# Patient Record
Sex: MALE | Race: WHITE
[De-identification: names, ages, dates, MRNs, and addresses within clinical notes are randomized per-mention and may not be internally consistent; named-entity substitution may affect disease eponyms.]

---

## 2018-06-24 ENCOUNTER — HOSPITAL ENCOUNTER (INPATIENT)
Dept: HOSPITAL 35 - ER | Age: 53
LOS: 7 days | Discharge: HOME | DRG: 391 | End: 2018-07-01
Attending: INTERNAL MEDICINE | Admitting: INTERNAL MEDICINE
Payer: COMMERCIAL

## 2018-06-24 VITALS — SYSTOLIC BLOOD PRESSURE: 128 MMHG | DIASTOLIC BLOOD PRESSURE: 71 MMHG

## 2018-06-24 VITALS — SYSTOLIC BLOOD PRESSURE: 128 MMHG | DIASTOLIC BLOOD PRESSURE: 74 MMHG

## 2018-06-24 VITALS — BODY MASS INDEX: 42.66 KG/M2 | HEIGHT: 72.01 IN | WEIGHT: 315 LBS

## 2018-06-24 VITALS — DIASTOLIC BLOOD PRESSURE: 77 MMHG | SYSTOLIC BLOOD PRESSURE: 145 MMHG

## 2018-06-24 VITALS — SYSTOLIC BLOOD PRESSURE: 146 MMHG | DIASTOLIC BLOOD PRESSURE: 57 MMHG

## 2018-06-24 VITALS — SYSTOLIC BLOOD PRESSURE: 137 MMHG | DIASTOLIC BLOOD PRESSURE: 70 MMHG

## 2018-06-24 DIAGNOSIS — E66.01: ICD-10-CM

## 2018-06-24 DIAGNOSIS — E43: ICD-10-CM

## 2018-06-24 DIAGNOSIS — K57.20: Primary | ICD-10-CM

## 2018-06-24 DIAGNOSIS — I10: ICD-10-CM

## 2018-06-24 DIAGNOSIS — Z87.81: ICD-10-CM

## 2018-06-24 DIAGNOSIS — M19.90: ICD-10-CM

## 2018-06-24 DIAGNOSIS — Z96.643: ICD-10-CM

## 2018-06-24 DIAGNOSIS — Z79.899: ICD-10-CM

## 2018-06-24 LAB
ALBUMIN SERPL-MCNC: 3.1 G/DL (ref 3.4–5)
ALT SERPL-CCNC: 25 U/L (ref 30–65)
ANION GAP SERPL CALC-SCNC: 9 MMOL/L (ref 7–16)
AST SERPL-CCNC: 17 U/L (ref 15–37)
BASOPHILS NFR BLD AUTO: 0.3 % (ref 0–2)
BILIRUB SERPL-MCNC: 2.4 MG/DL
BILIRUB UR-MCNC: NEGATIVE MG/DL
BUN SERPL-MCNC: 12 MG/DL (ref 7–18)
CALCIUM SERPL-MCNC: 8.8 MG/DL (ref 8.5–10.1)
CHLORIDE SERPL-SCNC: 100 MMOL/L (ref 98–107)
CO2 SERPL-SCNC: 23 MMOL/L (ref 21–32)
COLOR UR: YELLOW
CREAT SERPL-MCNC: 0.9 MG/DL (ref 0.7–1.3)
EOSINOPHIL NFR BLD: 0.1 % (ref 0–3)
ERYTHROCYTE [DISTWIDTH] IN BLOOD BY AUTOMATED COUNT: 13.8 % (ref 10.5–14.5)
GLUCOSE SERPL-MCNC: 177 MG/DL (ref 74–106)
GRANULOCYTES NFR BLD MANUAL: 87.2 % (ref 36–66)
HCT VFR BLD CALC: 43.6 % (ref 42–52)
HGB BLD-MCNC: 14.8 GM/DL (ref 14–18)
KETONES UR STRIP-MCNC: NEGATIVE MG/DL
LIPASE: 97 U/L (ref 73–393)
LYMPHOCYTES NFR BLD AUTO: 8.5 % (ref 24–44)
MCH RBC QN AUTO: 28.9 PG (ref 26–34)
MCHC RBC AUTO-ENTMCNC: 33.9 G/DL (ref 28–37)
MCV RBC: 85.2 FL (ref 80–100)
MONOCYTES NFR BLD: 3.9 % (ref 1–8)
NEUTROPHILS # BLD: 16.6 THOU/UL (ref 1.4–8.2)
PLATELET # BLD: 220 THOU/UL (ref 150–400)
POTASSIUM SERPL-SCNC: 3.9 MMOL/L (ref 3.5–5.1)
PROT SERPL-MCNC: 7.4 G/DL (ref 6.4–8.2)
RBC # BLD AUTO: 5.12 MIL/UL (ref 4.5–6)
RBC # UR STRIP: NEGATIVE /UL
SODIUM SERPL-SCNC: 132 MMOL/L (ref 136–145)
SP GR UR STRIP: 1.02 (ref 1–1.03)
URINE CLARITY: (no result)
URINE GLUCOSE-RANDOM*: NEGATIVE
URINE LEUKOCYTES-REFLEX: (no result)
URINE NITRITE-REFLEX: NEGATIVE
URINE PROTEIN (DIPSTICK): (no result)
UROBILINOGEN UR STRIP-ACNC: 1 E.U./DL (ref 0.2–1)
WBC # BLD AUTO: 19 THOU/UL (ref 4–11)

## 2018-06-24 PROCEDURE — 27001 TENOTOMY ADDUCTOR HIP OPEN: CPT

## 2018-06-24 PROCEDURE — 10183: CPT

## 2018-06-24 PROCEDURE — 15002 WOUND PREP TRK/ARM/LEG: CPT

## 2018-06-25 VITALS — SYSTOLIC BLOOD PRESSURE: 144 MMHG | DIASTOLIC BLOOD PRESSURE: 76 MMHG

## 2018-06-25 VITALS — SYSTOLIC BLOOD PRESSURE: 149 MMHG | DIASTOLIC BLOOD PRESSURE: 62 MMHG

## 2018-06-26 VITALS — DIASTOLIC BLOOD PRESSURE: 57 MMHG | SYSTOLIC BLOOD PRESSURE: 131 MMHG

## 2018-06-26 VITALS — DIASTOLIC BLOOD PRESSURE: 79 MMHG | SYSTOLIC BLOOD PRESSURE: 151 MMHG

## 2018-06-26 VITALS — DIASTOLIC BLOOD PRESSURE: 63 MMHG | SYSTOLIC BLOOD PRESSURE: 120 MMHG

## 2018-06-26 VITALS — DIASTOLIC BLOOD PRESSURE: 76 MMHG | SYSTOLIC BLOOD PRESSURE: 148 MMHG

## 2018-06-26 VITALS — SYSTOLIC BLOOD PRESSURE: 131 MMHG | DIASTOLIC BLOOD PRESSURE: 84 MMHG

## 2018-06-26 LAB
ALBUMIN SERPL-MCNC: 2.7 G/DL (ref 3.4–5)
ALT SERPL-CCNC: 19 U/L (ref 30–65)
ANION GAP SERPL CALC-SCNC: 5 MMOL/L (ref 7–16)
AST SERPL-CCNC: 19 U/L (ref 15–37)
BILIRUB SERPL-MCNC: 1.1 MG/DL
BUN SERPL-MCNC: 7 MG/DL (ref 7–18)
CALCIUM SERPL-MCNC: 8.4 MG/DL (ref 8.5–10.1)
CHLORIDE SERPL-SCNC: 101 MMOL/L (ref 98–107)
CO2 SERPL-SCNC: 26 MMOL/L (ref 21–32)
CREAT SERPL-MCNC: 0.7 MG/DL (ref 0.7–1.3)
ERYTHROCYTE [DISTWIDTH] IN BLOOD BY AUTOMATED COUNT: 13.8 % (ref 10.5–14.5)
GLUCOSE SERPL-MCNC: 148 MG/DL (ref 74–106)
HCT VFR BLD CALC: 40 % (ref 42–52)
HGB BLD-MCNC: 13.7 GM/DL (ref 14–18)
MCH RBC QN AUTO: 29.2 PG (ref 26–34)
MCHC RBC AUTO-ENTMCNC: 34.3 G/DL (ref 28–37)
MCV RBC: 85.3 FL (ref 80–100)
PLATELET # BLD: 190 THOU/UL (ref 150–400)
POTASSIUM SERPL-SCNC: 3.5 MMOL/L (ref 3.5–5.1)
PROT SERPL-MCNC: 6.9 G/DL (ref 6.4–8.2)
RBC # BLD AUTO: 4.69 MIL/UL (ref 4.5–6)
SODIUM SERPL-SCNC: 132 MMOL/L (ref 136–145)
WBC # BLD AUTO: 10.2 THOU/UL (ref 4–11)

## 2018-06-27 VITALS — DIASTOLIC BLOOD PRESSURE: 81 MMHG | SYSTOLIC BLOOD PRESSURE: 129 MMHG

## 2018-06-27 VITALS — SYSTOLIC BLOOD PRESSURE: 134 MMHG | DIASTOLIC BLOOD PRESSURE: 85 MMHG

## 2018-06-27 LAB
ANION GAP SERPL CALC-SCNC: 5 MMOL/L (ref 7–16)
BILIRUB UR-MCNC: NEGATIVE MG/DL
BUN SERPL-MCNC: 8 MG/DL (ref 7–18)
CALCIUM SERPL-MCNC: 8.6 MG/DL (ref 8.5–10.1)
CHLORIDE SERPL-SCNC: 102 MMOL/L (ref 98–107)
CO2 SERPL-SCNC: 27 MMOL/L (ref 21–32)
COLOR UR: (no result)
CREAT SERPL-MCNC: 0.8 MG/DL (ref 0.7–1.3)
ERYTHROCYTE [DISTWIDTH] IN BLOOD BY AUTOMATED COUNT: 13.8 % (ref 10.5–14.5)
GLUCOSE SERPL-MCNC: 171 MG/DL (ref 74–106)
HCT VFR BLD CALC: 41.5 % (ref 42–52)
HGB BLD-MCNC: 14 GM/DL (ref 14–18)
KETONES UR STRIP-MCNC: NEGATIVE MG/DL
MCH RBC QN AUTO: 28.9 PG (ref 26–34)
MCHC RBC AUTO-ENTMCNC: 33.7 G/DL (ref 28–37)
MCV RBC: 85.7 FL (ref 80–100)
PLATELET # BLD: 240 THOU/UL (ref 150–400)
POTASSIUM SERPL-SCNC: 3.7 MMOL/L (ref 3.5–5.1)
RBC # BLD AUTO: 4.84 MIL/UL (ref 4.5–6)
RBC # UR STRIP: NEGATIVE /UL
SODIUM SERPL-SCNC: 134 MMOL/L (ref 136–145)
SP GR UR STRIP: 1.02 (ref 1–1.03)
URINE CLARITY: CLEAR
URINE GLUCOSE-RANDOM*: NEGATIVE
URINE LEUKOCYTES-REFLEX: NEGATIVE
URINE NITRITE-REFLEX: NEGATIVE
URINE PROTEIN (DIPSTICK): NEGATIVE
UROBILINOGEN UR STRIP-ACNC: 2 E.U./DL (ref 0.2–1)
WBC # BLD AUTO: 9.2 THOU/UL (ref 4–11)

## 2018-06-28 VITALS — DIASTOLIC BLOOD PRESSURE: 89 MMHG | SYSTOLIC BLOOD PRESSURE: 158 MMHG

## 2018-06-28 VITALS — SYSTOLIC BLOOD PRESSURE: 130 MMHG | DIASTOLIC BLOOD PRESSURE: 78 MMHG

## 2018-06-28 PROCEDURE — B54NZZA ULTRASONOGRAPHY OF LEFT UPPER EXTREMITY VEINS, GUIDANCE: ICD-10-PCS | Performed by: INTERNAL MEDICINE

## 2018-06-28 PROCEDURE — 05HY33Z INSERTION OF INFUSION DEVICE INTO UPPER VEIN, PERCUTANEOUS APPROACH: ICD-10-PCS | Performed by: INTERNAL MEDICINE

## 2018-06-29 VITALS — DIASTOLIC BLOOD PRESSURE: 89 MMHG | SYSTOLIC BLOOD PRESSURE: 158 MMHG

## 2018-06-29 VITALS — SYSTOLIC BLOOD PRESSURE: 151 MMHG | DIASTOLIC BLOOD PRESSURE: 102 MMHG

## 2018-06-29 LAB
ANION GAP SERPL CALC-SCNC: 10 MMOL/L (ref 7–16)
BUN SERPL-MCNC: 13 MG/DL (ref 7–18)
CALCIUM SERPL-MCNC: 8.5 MG/DL (ref 8.5–10.1)
CHLORIDE SERPL-SCNC: 105 MMOL/L (ref 98–107)
CO2 SERPL-SCNC: 25 MMOL/L (ref 21–32)
CREAT SERPL-MCNC: 0.7 MG/DL (ref 0.7–1.3)
ERYTHROCYTE [DISTWIDTH] IN BLOOD BY AUTOMATED COUNT: 13.9 % (ref 10.5–14.5)
GLUCOSE SERPL-MCNC: 110 MG/DL (ref 74–106)
HCT VFR BLD CALC: 42.2 % (ref 42–52)
HGB BLD-MCNC: 14.6 GM/DL (ref 14–18)
MCH RBC QN AUTO: 28.8 PG (ref 26–34)
MCHC RBC AUTO-ENTMCNC: 34.6 G/DL (ref 28–37)
MCV RBC: 83.3 FL (ref 80–100)
PLATELET # BLD: 276 THOU/UL (ref 150–400)
POTASSIUM SERPL-SCNC: 4.4 MMOL/L (ref 3.5–5.1)
RBC # BLD AUTO: 5.07 MIL/UL (ref 4.5–6)
SODIUM SERPL-SCNC: 140 MMOL/L (ref 136–145)
WBC # BLD AUTO: 9.9 THOU/UL (ref 4–11)

## 2018-06-30 VITALS — DIASTOLIC BLOOD PRESSURE: 104 MMHG | SYSTOLIC BLOOD PRESSURE: 149 MMHG

## 2018-06-30 VITALS — DIASTOLIC BLOOD PRESSURE: 88 MMHG | SYSTOLIC BLOOD PRESSURE: 131 MMHG

## 2018-07-01 VITALS — DIASTOLIC BLOOD PRESSURE: 94 MMHG | SYSTOLIC BLOOD PRESSURE: 138 MMHG

## 2018-07-02 ENCOUNTER — HOSPITAL ENCOUNTER (OUTPATIENT)
Dept: HOSPITAL 35 - OPONC | Age: 53
End: 2018-07-02
Attending: SPECIALIST
Payer: COMMERCIAL

## 2018-07-02 VITALS — SYSTOLIC BLOOD PRESSURE: 124 MMHG | DIASTOLIC BLOOD PRESSURE: 86 MMHG

## 2018-07-02 DIAGNOSIS — K57.20: Primary | ICD-10-CM

## 2018-07-02 LAB
ALBUMIN SERPL-MCNC: 3.2 G/DL (ref 3.4–5)
ALT SERPL-CCNC: 64 U/L (ref 30–65)
ANION GAP SERPL CALC-SCNC: 6 MMOL/L (ref 7–16)
AST SERPL-CCNC: 29 U/L (ref 15–37)
BILIRUB SERPL-MCNC: 0.5 MG/DL
BUN SERPL-MCNC: 16 MG/DL (ref 7–18)
CALCIUM SERPL-MCNC: 9.2 MG/DL (ref 8.5–10.1)
CHLORIDE SERPL-SCNC: 103 MMOL/L (ref 98–107)
CO2 SERPL-SCNC: 27 MMOL/L (ref 21–32)
CREAT SERPL-MCNC: 0.8 MG/DL (ref 0.7–1.3)
ERYTHROCYTE [DISTWIDTH] IN BLOOD BY AUTOMATED COUNT: 14.1 % (ref 10.5–14.5)
GLUCOSE SERPL-MCNC: 123 MG/DL (ref 74–106)
HCT VFR BLD CALC: 44.8 % (ref 42–52)
HGB BLD-MCNC: 15.6 GM/DL (ref 14–18)
MCH RBC QN AUTO: 28.9 PG (ref 26–34)
MCHC RBC AUTO-ENTMCNC: 34.7 G/DL (ref 28–37)
MCV RBC: 83.3 FL (ref 80–100)
PLATELET # BLD: 343 THOU/UL (ref 150–400)
POTASSIUM SERPL-SCNC: 4.2 MMOL/L (ref 3.5–5.1)
PROT SERPL-MCNC: 8 G/DL (ref 6.4–8.2)
RBC # BLD AUTO: 5.38 MIL/UL (ref 4.5–6)
SODIUM SERPL-SCNC: 136 MMOL/L (ref 136–145)
WBC # BLD AUTO: 11.1 THOU/UL (ref 4–11)

## 2018-07-02 PROCEDURE — 95000: CPT

## 2018-07-04 ENCOUNTER — HOSPITAL ENCOUNTER (OUTPATIENT)
Dept: HOSPITAL 35 - OPONC | Age: 53
End: 2018-07-04
Attending: SPECIALIST
Payer: COMMERCIAL

## 2018-07-04 DIAGNOSIS — K57.20: Primary | ICD-10-CM

## 2018-07-04 PROCEDURE — 95000: CPT

## 2018-07-05 ENCOUNTER — HOSPITAL ENCOUNTER (OUTPATIENT)
Dept: HOSPITAL 35 - OPONC | Age: 53
End: 2018-07-05
Attending: SPECIALIST
Payer: COMMERCIAL

## 2018-07-05 VITALS — SYSTOLIC BLOOD PRESSURE: 140 MMHG | DIASTOLIC BLOOD PRESSURE: 74 MMHG

## 2018-07-05 DIAGNOSIS — K57.20: Primary | ICD-10-CM

## 2018-07-05 PROCEDURE — 95000: CPT

## 2018-07-06 ENCOUNTER — HOSPITAL ENCOUNTER (OUTPATIENT)
Dept: HOSPITAL 35 - OPONC | Age: 53
End: 2018-07-06
Attending: SPECIALIST
Payer: COMMERCIAL

## 2018-07-06 VITALS — SYSTOLIC BLOOD PRESSURE: 142 MMHG | DIASTOLIC BLOOD PRESSURE: 68 MMHG

## 2018-07-06 DIAGNOSIS — K57.20: Primary | ICD-10-CM

## 2018-07-06 PROCEDURE — 95000: CPT

## 2018-07-07 ENCOUNTER — HOSPITAL ENCOUNTER (OUTPATIENT)
Dept: HOSPITAL 35 - OPONC | Age: 53
End: 2018-07-07
Attending: SPECIALIST
Payer: COMMERCIAL

## 2018-07-07 DIAGNOSIS — K57.20: Primary | ICD-10-CM

## 2018-07-07 PROCEDURE — 95000: CPT

## 2018-07-08 ENCOUNTER — HOSPITAL ENCOUNTER (OUTPATIENT)
Dept: HOSPITAL 35 - OPONC | Age: 53
End: 2018-07-08
Attending: SPECIALIST
Payer: COMMERCIAL

## 2018-07-08 DIAGNOSIS — K57.20: Primary | ICD-10-CM

## 2018-07-08 PROCEDURE — 95000: CPT

## 2018-07-09 ENCOUNTER — HOSPITAL ENCOUNTER (OUTPATIENT)
Dept: HOSPITAL 35 - OPONC | Age: 53
End: 2018-07-09
Attending: SPECIALIST
Payer: COMMERCIAL

## 2018-07-09 VITALS — DIASTOLIC BLOOD PRESSURE: 76 MMHG | SYSTOLIC BLOOD PRESSURE: 142 MMHG

## 2018-07-09 DIAGNOSIS — K57.20: Primary | ICD-10-CM

## 2018-07-09 LAB
ALBUMIN SERPL-MCNC: 3.1 G/DL (ref 3.4–5)
ALT SERPL-CCNC: 39 U/L (ref 30–65)
ANION GAP SERPL CALC-SCNC: 8 MMOL/L (ref 7–16)
AST SERPL-CCNC: 21 U/L (ref 15–37)
BILIRUB SERPL-MCNC: 0.6 MG/DL
BUN SERPL-MCNC: 18 MG/DL (ref 7–18)
CALCIUM SERPL-MCNC: 8.8 MG/DL (ref 8.5–10.1)
CHLORIDE SERPL-SCNC: 102 MMOL/L (ref 98–107)
CO2 SERPL-SCNC: 26 MMOL/L (ref 21–32)
CREAT SERPL-MCNC: 1 MG/DL (ref 0.7–1.3)
ERYTHROCYTE [DISTWIDTH] IN BLOOD BY AUTOMATED COUNT: 14.1 % (ref 10.5–14.5)
GLUCOSE SERPL-MCNC: 216 MG/DL (ref 74–106)
HCT VFR BLD CALC: 44.1 % (ref 42–52)
HGB BLD-MCNC: 14.7 GM/DL (ref 14–18)
MCH RBC QN AUTO: 28.9 PG (ref 26–34)
MCHC RBC AUTO-ENTMCNC: 33.4 G/DL (ref 28–37)
MCV RBC: 86.7 FL (ref 80–100)
PLATELET # BLD: 338 THOU/UL (ref 150–400)
POTASSIUM SERPL-SCNC: 4.2 MMOL/L (ref 3.5–5.1)
PROT SERPL-MCNC: 7.6 G/DL (ref 6.4–8.2)
RBC # BLD AUTO: 5.09 MIL/UL (ref 4.5–6)
SODIUM SERPL-SCNC: 136 MMOL/L (ref 136–145)
WBC # BLD AUTO: 9.8 THOU/UL (ref 4–11)

## 2018-07-09 PROCEDURE — 95000: CPT

## 2018-07-10 ENCOUNTER — HOSPITAL ENCOUNTER (OUTPATIENT)
Dept: HOSPITAL 35 - OPONC | Age: 53
End: 2018-07-10
Attending: SPECIALIST
Payer: COMMERCIAL

## 2018-07-10 VITALS — DIASTOLIC BLOOD PRESSURE: 84 MMHG | SYSTOLIC BLOOD PRESSURE: 144 MMHG

## 2018-07-10 DIAGNOSIS — K57.20: Primary | ICD-10-CM

## 2018-07-10 PROCEDURE — 95000: CPT

## 2018-07-11 ENCOUNTER — HOSPITAL ENCOUNTER (OUTPATIENT)
Dept: HOSPITAL 35 - OPONC | Age: 53
End: 2018-07-11
Attending: SPECIALIST
Payer: COMMERCIAL

## 2018-07-11 VITALS — DIASTOLIC BLOOD PRESSURE: 86 MMHG | SYSTOLIC BLOOD PRESSURE: 145 MMHG

## 2018-07-11 DIAGNOSIS — K57.20: Primary | ICD-10-CM

## 2018-07-11 PROCEDURE — 95000: CPT

## 2018-07-12 ENCOUNTER — HOSPITAL ENCOUNTER (OUTPATIENT)
Dept: HOSPITAL 35 - OPONC | Age: 53
End: 2018-07-12
Attending: SPECIALIST
Payer: COMMERCIAL

## 2018-07-12 VITALS — SYSTOLIC BLOOD PRESSURE: 136 MMHG | DIASTOLIC BLOOD PRESSURE: 93 MMHG

## 2018-07-12 DIAGNOSIS — K57.20: Primary | ICD-10-CM

## 2018-07-12 PROCEDURE — 95000: CPT

## 2018-07-13 ENCOUNTER — HOSPITAL ENCOUNTER (OUTPATIENT)
Dept: HOSPITAL 35 - OPONC | Age: 53
End: 2018-07-13
Attending: SPECIALIST
Payer: COMMERCIAL

## 2018-07-13 VITALS — SYSTOLIC BLOOD PRESSURE: 130 MMHG | DIASTOLIC BLOOD PRESSURE: 78 MMHG

## 2018-07-13 DIAGNOSIS — K57.20: Primary | ICD-10-CM

## 2018-07-13 PROCEDURE — 95000: CPT

## 2018-07-14 ENCOUNTER — HOSPITAL ENCOUNTER (OUTPATIENT)
Dept: HOSPITAL 35 - OPONC | Age: 53
End: 2018-07-14
Attending: SPECIALIST
Payer: COMMERCIAL

## 2018-07-14 DIAGNOSIS — K57.20: Primary | ICD-10-CM

## 2018-07-14 PROCEDURE — 95000: CPT

## 2018-07-15 ENCOUNTER — HOSPITAL ENCOUNTER (OUTPATIENT)
Dept: HOSPITAL 35 - OPONC | Age: 53
End: 2018-07-15
Attending: SPECIALIST
Payer: COMMERCIAL

## 2018-07-15 DIAGNOSIS — K57.20: Primary | ICD-10-CM

## 2018-07-15 PROCEDURE — 95000: CPT

## 2018-07-30 ENCOUNTER — HOSPITAL ENCOUNTER (OUTPATIENT)
Dept: HOSPITAL 35 - OPONC | Age: 53
End: 2018-07-30
Attending: SPECIALIST
Payer: COMMERCIAL

## 2018-07-30 DIAGNOSIS — K57.20: Primary | ICD-10-CM

## 2018-07-30 LAB
ALBUMIN SERPL-MCNC: 3.4 G/DL (ref 3.4–5)
ALT SERPL-CCNC: 27 U/L (ref 30–65)
ANION GAP SERPL CALC-SCNC: 9 MMOL/L (ref 7–16)
AST SERPL-CCNC: 18 U/L (ref 15–37)
BILIRUB SERPL-MCNC: 0.4 MG/DL
BUN SERPL-MCNC: 13 MG/DL (ref 7–18)
CALCIUM SERPL-MCNC: 9 MG/DL (ref 8.5–10.1)
CHLORIDE SERPL-SCNC: 106 MMOL/L (ref 98–107)
CO2 SERPL-SCNC: 24 MMOL/L (ref 21–32)
CREAT SERPL-MCNC: 0.8 MG/DL (ref 0.7–1.3)
ERYTHROCYTE [DISTWIDTH] IN BLOOD BY AUTOMATED COUNT: 14.1 % (ref 10.5–14.5)
GLUCOSE SERPL-MCNC: 149 MG/DL (ref 74–106)
HCT VFR BLD CALC: 46.6 % (ref 42–52)
HGB BLD-MCNC: 15.8 GM/DL (ref 14–18)
MCH RBC QN AUTO: 29 PG (ref 26–34)
MCHC RBC AUTO-ENTMCNC: 33.9 G/DL (ref 28–37)
MCV RBC: 85.6 FL (ref 80–100)
PLATELET # BLD: 219 THOU/UL (ref 150–400)
POTASSIUM SERPL-SCNC: 4.5 MMOL/L (ref 3.5–5.1)
PROT SERPL-MCNC: 7.4 G/DL (ref 6.4–8.2)
RBC # BLD AUTO: 5.45 MIL/UL (ref 4.5–6)
SODIUM SERPL-SCNC: 139 MMOL/L (ref 136–145)
WBC # BLD AUTO: 8 THOU/UL (ref 4–11)

## 2018-07-30 PROCEDURE — 91016: CPT

## 2018-07-31 ENCOUNTER — HOSPITAL ENCOUNTER (INPATIENT)
Dept: HOSPITAL 35 - ER | Age: 53
LOS: 4 days | Discharge: HOME HEALTH | DRG: 339 | End: 2018-08-04
Attending: INTERNAL MEDICINE | Admitting: INTERNAL MEDICINE
Payer: COMMERCIAL

## 2018-07-31 VITALS — SYSTOLIC BLOOD PRESSURE: 129 MMHG | DIASTOLIC BLOOD PRESSURE: 52 MMHG

## 2018-07-31 VITALS — DIASTOLIC BLOOD PRESSURE: 109 MMHG | SYSTOLIC BLOOD PRESSURE: 183 MMHG

## 2018-07-31 VITALS — SYSTOLIC BLOOD PRESSURE: 120 MMHG | DIASTOLIC BLOOD PRESSURE: 70 MMHG

## 2018-07-31 VITALS — WEIGHT: 315 LBS | HEIGHT: 72.01 IN | BODY MASS INDEX: 42.66 KG/M2

## 2018-07-31 VITALS — SYSTOLIC BLOOD PRESSURE: 141 MMHG | DIASTOLIC BLOOD PRESSURE: 80 MMHG

## 2018-07-31 VITALS — SYSTOLIC BLOOD PRESSURE: 183 MMHG | DIASTOLIC BLOOD PRESSURE: 109 MMHG

## 2018-07-31 DIAGNOSIS — K57.32: ICD-10-CM

## 2018-07-31 DIAGNOSIS — Z96.643: ICD-10-CM

## 2018-07-31 DIAGNOSIS — K59.00: ICD-10-CM

## 2018-07-31 DIAGNOSIS — E44.1: ICD-10-CM

## 2018-07-31 DIAGNOSIS — I10: ICD-10-CM

## 2018-07-31 DIAGNOSIS — E66.01: ICD-10-CM

## 2018-07-31 DIAGNOSIS — Z79.899: ICD-10-CM

## 2018-07-31 DIAGNOSIS — T40.605A: ICD-10-CM

## 2018-07-31 DIAGNOSIS — K35.3: Primary | ICD-10-CM

## 2018-07-31 DIAGNOSIS — G89.29: ICD-10-CM

## 2018-07-31 DIAGNOSIS — M19.90: ICD-10-CM

## 2018-07-31 DIAGNOSIS — M54.9: ICD-10-CM

## 2018-07-31 LAB
ALBUMIN SERPL-MCNC: 3.2 G/DL (ref 3.4–5)
ALT SERPL-CCNC: 27 U/L (ref 30–65)
ANION GAP SERPL CALC-SCNC: 9 MMOL/L (ref 7–16)
AST SERPL-CCNC: 17 U/L (ref 15–37)
BASOPHILS NFR BLD AUTO: 0.6 % (ref 0–2)
BILIRUB SERPL-MCNC: 0.5 MG/DL
BILIRUB UR-MCNC: NEGATIVE MG/DL
BUN SERPL-MCNC: 21 MG/DL (ref 7–18)
CALCIUM SERPL-MCNC: 9 MG/DL (ref 8.5–10.1)
CHLORIDE SERPL-SCNC: 103 MMOL/L (ref 98–107)
CO2 SERPL-SCNC: 23 MMOL/L (ref 21–32)
COLOR UR: YELLOW
CREAT SERPL-MCNC: 0.8 MG/DL (ref 0.7–1.3)
EOSINOPHIL NFR BLD: 2.6 % (ref 0–3)
ERYTHROCYTE [DISTWIDTH] IN BLOOD BY AUTOMATED COUNT: 14.7 % (ref 10.5–14.5)
GLUCOSE SERPL-MCNC: 182 MG/DL (ref 74–106)
GRANULOCYTES NFR BLD MANUAL: 73.7 % (ref 36–66)
HCT VFR BLD CALC: 44.5 % (ref 42–52)
HGB BLD-MCNC: 15.1 GM/DL (ref 14–18)
KETONES UR STRIP-MCNC: NEGATIVE MG/DL
LIPASE: 164 U/L (ref 73–393)
LYMPHOCYTES NFR BLD AUTO: 17.2 % (ref 24–44)
MCH RBC QN AUTO: 29.1 PG (ref 26–34)
MCHC RBC AUTO-ENTMCNC: 33.9 G/DL (ref 28–37)
MCV RBC: 85.8 FL (ref 80–100)
MONOCYTES NFR BLD: 5.9 % (ref 1–8)
NEUTROPHILS # BLD: 10.8 THOU/UL (ref 1.4–8.2)
PLATELET # BLD: 236 THOU/UL (ref 150–400)
POTASSIUM SERPL-SCNC: 4.1 MMOL/L (ref 3.5–5.1)
PROT SERPL-MCNC: 7.4 G/DL (ref 6.4–8.2)
RBC # BLD AUTO: 5.18 MIL/UL (ref 4.5–6)
RBC # UR STRIP: NEGATIVE /UL
SODIUM SERPL-SCNC: 135 MMOL/L (ref 136–145)
SP GR UR STRIP: 1.01 (ref 1–1.03)
TROPONIN I SERPL-MCNC: <0.06 NG/ML (ref ?–0.06)
URINE CLARITY: CLEAR
URINE GLUCOSE-RANDOM*: NEGATIVE
URINE LEUKOCYTES-REFLEX: NEGATIVE
URINE NITRITE-REFLEX: NEGATIVE
URINE PROTEIN (DIPSTICK): NEGATIVE
UROBILINOGEN UR STRIP-ACNC: 0.2 E.U./DL (ref 0.2–1)
WBC # BLD AUTO: 14.7 THOU/UL (ref 4–11)

## 2018-07-31 PROCEDURE — 50010 RENAL EXPLORATION: CPT

## 2018-07-31 PROCEDURE — 62110: CPT

## 2018-07-31 PROCEDURE — 15002 WOUND PREP TRK/ARM/LEG: CPT

## 2018-07-31 PROCEDURE — 10183: CPT

## 2018-07-31 PROCEDURE — 50739: CPT

## 2018-07-31 PROCEDURE — 56527: CPT

## 2018-07-31 PROCEDURE — 52265 CYSTOSCOPY AND TREATMENT: CPT

## 2018-07-31 PROCEDURE — 50740 FUSION OF URETER & KIDNEY: CPT

## 2018-07-31 PROCEDURE — 50558: CPT

## 2018-07-31 PROCEDURE — 56526: CPT

## 2018-07-31 PROCEDURE — 50249: CPT

## 2018-07-31 PROCEDURE — 50555 KIDNEY ENDOSCOPY & BIOPSY: CPT

## 2018-07-31 PROCEDURE — 51975: CPT

## 2018-07-31 PROCEDURE — 62900: CPT

## 2018-07-31 PROCEDURE — 50331: CPT

## 2018-07-31 PROCEDURE — 50101: CPT

## 2018-07-31 PROCEDURE — 53307: CPT

## 2018-07-31 PROCEDURE — 54118: CPT

## 2018-07-31 PROCEDURE — 0DTJ4ZZ RESECTION OF APPENDIX, PERCUTANEOUS ENDOSCOPIC APPROACH: ICD-10-PCS | Performed by: SURGERY

## 2018-07-31 PROCEDURE — 56525: CPT

## 2018-07-31 PROCEDURE — 54022: CPT

## 2018-07-31 PROCEDURE — 70005: CPT

## 2018-07-31 PROCEDURE — 50411: CPT

## 2018-07-31 PROCEDURE — 51489: CPT

## 2018-08-01 VITALS — DIASTOLIC BLOOD PRESSURE: 89 MMHG | SYSTOLIC BLOOD PRESSURE: 142 MMHG

## 2018-08-01 VITALS — SYSTOLIC BLOOD PRESSURE: 149 MMHG | DIASTOLIC BLOOD PRESSURE: 77 MMHG

## 2018-08-01 LAB
ANION GAP SERPL CALC-SCNC: 8 MMOL/L (ref 7–16)
BUN SERPL-MCNC: 11 MG/DL (ref 7–18)
CALCIUM SERPL-MCNC: 8.3 MG/DL (ref 8.5–10.1)
CHLORIDE SERPL-SCNC: 104 MMOL/L (ref 98–107)
CO2 SERPL-SCNC: 25 MMOL/L (ref 21–32)
CREAT SERPL-MCNC: 0.7 MG/DL (ref 0.7–1.3)
ERYTHROCYTE [DISTWIDTH] IN BLOOD BY AUTOMATED COUNT: 14.2 % (ref 10.5–14.5)
GLUCOSE SERPL-MCNC: 118 MG/DL (ref 74–106)
HCT VFR BLD CALC: 42.7 % (ref 42–52)
HGB BLD-MCNC: 14.6 GM/DL (ref 14–18)
MCH RBC QN AUTO: 29.4 PG (ref 26–34)
MCHC RBC AUTO-ENTMCNC: 34.1 G/DL (ref 28–37)
MCV RBC: 86.3 FL (ref 80–100)
PLATELET # BLD: 201 THOU/UL (ref 150–400)
POTASSIUM SERPL-SCNC: 4 MMOL/L (ref 3.5–5.1)
RBC # BLD AUTO: 4.95 MIL/UL (ref 4.5–6)
SODIUM SERPL-SCNC: 137 MMOL/L (ref 136–145)
WBC # BLD AUTO: 10.5 THOU/UL (ref 4–11)

## 2018-08-02 VITALS — SYSTOLIC BLOOD PRESSURE: 148 MMHG | DIASTOLIC BLOOD PRESSURE: 93 MMHG

## 2018-08-02 VITALS — SYSTOLIC BLOOD PRESSURE: 162 MMHG | DIASTOLIC BLOOD PRESSURE: 122 MMHG

## 2018-08-03 VITALS — SYSTOLIC BLOOD PRESSURE: 137 MMHG | DIASTOLIC BLOOD PRESSURE: 77 MMHG

## 2018-08-03 VITALS — SYSTOLIC BLOOD PRESSURE: 148 MMHG | DIASTOLIC BLOOD PRESSURE: 93 MMHG

## 2018-08-03 VITALS — SYSTOLIC BLOOD PRESSURE: 169 MMHG | DIASTOLIC BLOOD PRESSURE: 91 MMHG

## 2018-08-04 VITALS — DIASTOLIC BLOOD PRESSURE: 113 MMHG | SYSTOLIC BLOOD PRESSURE: 135 MMHG

## 2018-08-10 ENCOUNTER — HOSPITAL ENCOUNTER (EMERGENCY)
Dept: HOSPITAL 35 - ER | Age: 53
Discharge: HOME | End: 2018-08-10
Payer: COMMERCIAL

## 2018-08-10 VITALS — WEIGHT: 315 LBS | HEIGHT: 73 IN | BODY MASS INDEX: 41.75 KG/M2

## 2018-08-10 DIAGNOSIS — Z91.048: ICD-10-CM

## 2018-08-10 DIAGNOSIS — M54.9: ICD-10-CM

## 2018-08-10 DIAGNOSIS — G89.29: ICD-10-CM

## 2018-08-10 DIAGNOSIS — K57.32: Primary | ICD-10-CM

## 2018-08-10 DIAGNOSIS — K57.90: ICD-10-CM

## 2018-08-10 DIAGNOSIS — I10: ICD-10-CM

## 2018-08-10 DIAGNOSIS — E66.01: ICD-10-CM

## 2018-08-10 DIAGNOSIS — Z96.643: ICD-10-CM

## 2018-08-10 DIAGNOSIS — Z96.612: ICD-10-CM

## 2018-08-10 LAB
ALBUMIN SERPL-MCNC: 3.1 G/DL (ref 3.4–5)
ALT SERPL-CCNC: 41 U/L (ref 30–65)
ANION GAP SERPL CALC-SCNC: 8 MMOL/L (ref 7–16)
AST SERPL-CCNC: 21 U/L (ref 15–37)
BASOPHILS NFR BLD AUTO: 1 % (ref 0–2)
BILIRUB DIRECT SERPL-MCNC: < 0.1 MG/DL
BILIRUB SERPL-MCNC: 0.2 MG/DL
BILIRUB UR-MCNC: NEGATIVE MG/DL
BUN SERPL-MCNC: 15 MG/DL (ref 7–18)
CALCIUM SERPL-MCNC: 8.9 MG/DL (ref 8.5–10.1)
CHLORIDE SERPL-SCNC: 103 MMOL/L (ref 98–107)
CO2 SERPL-SCNC: 25 MMOL/L (ref 21–32)
COLOR UR: YELLOW
CREAT SERPL-MCNC: 0.8 MG/DL (ref 0.7–1.3)
EOSINOPHIL NFR BLD: 5.4 % (ref 0–3)
ERYTHROCYTE [DISTWIDTH] IN BLOOD BY AUTOMATED COUNT: 14.1 % (ref 10.5–14.5)
GLUCOSE SERPL-MCNC: 130 MG/DL (ref 74–106)
GRANULOCYTES NFR BLD MANUAL: 64.5 % (ref 36–66)
HCT VFR BLD CALC: 43.7 % (ref 42–52)
HGB BLD-MCNC: 14.8 GM/DL (ref 14–18)
KETONES UR STRIP-MCNC: NEGATIVE MG/DL
LIPASE: 135 U/L (ref 73–393)
LYMPHOCYTES NFR BLD AUTO: 22.9 % (ref 24–44)
MCH RBC QN AUTO: 28.8 PG (ref 26–34)
MCHC RBC AUTO-ENTMCNC: 33.7 G/DL (ref 28–37)
MCV RBC: 85.4 FL (ref 80–100)
MONOCYTES NFR BLD: 6.2 % (ref 1–8)
NEUTROPHILS # BLD: 6.9 THOU/UL (ref 1.4–8.2)
NITRITE UR QL STRIP: NEGATIVE
PLATELET # BLD: 278 THOU/UL (ref 150–400)
POTASSIUM SERPL-SCNC: 4.2 MMOL/L (ref 3.5–5.1)
PROT SERPL-MCNC: 7.2 G/DL (ref 6.4–8.2)
RBC # BLD AUTO: 5.12 MIL/UL (ref 4.5–6)
RBC # UR STRIP: NEGATIVE /UL
SODIUM SERPL-SCNC: 136 MMOL/L (ref 136–145)
SP GR UR STRIP: >= 1.03 (ref 1–1.03)
URINE CLARITY: CLEAR
URINE GLUCOSE-RANDOM*: NEGATIVE
URINE LEUKOCYTES: NEGATIVE
URINE PROTEIN (DIPSTICK): NEGATIVE
UROBILINOGEN UR STRIP-ACNC: 0.2 E.U./DL (ref 0.2–1)
WBC # BLD AUTO: 10.6 THOU/UL (ref 4–11)

## 2018-08-17 ENCOUNTER — HOSPITAL ENCOUNTER (OUTPATIENT)
Dept: HOSPITAL 35 - OPONC | Age: 53
End: 2018-08-17
Attending: SPECIALIST
Payer: COMMERCIAL

## 2018-08-17 DIAGNOSIS — M54.5: ICD-10-CM

## 2018-08-17 DIAGNOSIS — K57.20: Primary | ICD-10-CM

## 2018-08-17 LAB
ALBUMIN SERPL-MCNC: 3.3 G/DL (ref 3.4–5)
ALT SERPL-CCNC: 35 U/L (ref 30–65)
ANION GAP SERPL CALC-SCNC: 7 MMOL/L (ref 7–16)
AST SERPL-CCNC: 16 U/L (ref 15–37)
BILIRUB SERPL-MCNC: 0.4 MG/DL
BUN SERPL-MCNC: 19 MG/DL (ref 7–18)
CALCIUM SERPL-MCNC: 9.1 MG/DL (ref 8.5–10.1)
CHLORIDE SERPL-SCNC: 102 MMOL/L (ref 98–107)
CO2 SERPL-SCNC: 25 MMOL/L (ref 21–32)
CREAT SERPL-MCNC: 0.8 MG/DL (ref 0.7–1.3)
ERYTHROCYTE [DISTWIDTH] IN BLOOD BY AUTOMATED COUNT: 14.1 % (ref 10.5–14.5)
GLUCOSE SERPL-MCNC: 134 MG/DL (ref 74–106)
HCT VFR BLD CALC: 45.2 % (ref 42–52)
HGB BLD-MCNC: 15.3 GM/DL (ref 14–18)
MCH RBC QN AUTO: 28.7 PG (ref 26–34)
MCHC RBC AUTO-ENTMCNC: 33.9 G/DL (ref 28–37)
MCV RBC: 84.8 FL (ref 80–100)
PLATELET # BLD: 252 THOU/UL (ref 150–400)
POTASSIUM SERPL-SCNC: 4.3 MMOL/L (ref 3.5–5.1)
PROT SERPL-MCNC: 7.1 G/DL (ref 6.4–8.2)
RBC # BLD AUTO: 5.33 MIL/UL (ref 4.5–6)
SODIUM SERPL-SCNC: 134 MMOL/L (ref 136–145)
WBC # BLD AUTO: 8.7 THOU/UL (ref 4–11)

## 2018-08-17 PROCEDURE — 91016: CPT

## 2018-08-24 ENCOUNTER — HOSPITAL ENCOUNTER (OUTPATIENT)
Dept: HOSPITAL 35 - GI | Age: 53
Discharge: HOME | End: 2018-08-24
Attending: SPECIALIST
Payer: COMMERCIAL

## 2018-08-24 VITALS — HEIGHT: 72.01 IN | BODY MASS INDEX: 42.66 KG/M2 | WEIGHT: 315 LBS

## 2018-08-24 DIAGNOSIS — Z80.0: ICD-10-CM

## 2018-08-24 DIAGNOSIS — D12.4: Primary | ICD-10-CM

## 2018-08-24 DIAGNOSIS — Z96.643: ICD-10-CM

## 2018-08-24 DIAGNOSIS — Z98.890: ICD-10-CM

## 2018-08-24 DIAGNOSIS — E66.01: ICD-10-CM

## 2018-08-24 DIAGNOSIS — K57.30: ICD-10-CM

## 2018-08-24 DIAGNOSIS — K63.5: ICD-10-CM

## 2018-08-24 DIAGNOSIS — Z87.891: ICD-10-CM

## 2018-08-24 PROCEDURE — 62110: CPT

## 2018-08-24 PROCEDURE — 62900: CPT

## 2019-01-30 ENCOUNTER — HOSPITAL ENCOUNTER (EMERGENCY)
Dept: HOSPITAL 35 - ER | Age: 54
Discharge: HOME | End: 2019-01-30
Payer: COMMERCIAL

## 2019-01-30 VITALS — BODY MASS INDEX: 42.66 KG/M2 | HEIGHT: 72 IN | WEIGHT: 315 LBS

## 2019-01-30 VITALS — DIASTOLIC BLOOD PRESSURE: 96 MMHG | SYSTOLIC BLOOD PRESSURE: 182 MMHG
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
## 2019-01-30 DIAGNOSIS — G89.29: ICD-10-CM
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
## 2019-01-30 DIAGNOSIS — Z88.8: ICD-10-CM

## 2019-01-30 DIAGNOSIS — M54.12: ICD-10-CM
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
## 2019-01-30 DIAGNOSIS — Z96.643: ICD-10-CM

## 2019-01-30 DIAGNOSIS — S29.012A: Primary | ICD-10-CM

## 2019-01-30 DIAGNOSIS — Z90.49: ICD-10-CM

## 2019-04-02 ENCOUNTER — HOSPITAL ENCOUNTER (INPATIENT)
Dept: HOSPITAL 35 - 4W | Age: 54
LOS: 1 days | Discharge: HOME | DRG: 393 | End: 2019-04-03
Attending: INTERNAL MEDICINE | Admitting: INTERNAL MEDICINE
Payer: COMMERCIAL

## 2019-04-02 VITALS — HEIGHT: 72.01 IN | WEIGHT: 315 LBS | BODY MASS INDEX: 42.66 KG/M2

## 2019-04-02 VITALS — DIASTOLIC BLOOD PRESSURE: 73 MMHG | SYSTOLIC BLOOD PRESSURE: 123 MMHG

## 2019-04-02 VITALS — DIASTOLIC BLOOD PRESSURE: 104 MMHG | SYSTOLIC BLOOD PRESSURE: 158 MMHG

## 2019-04-02 DIAGNOSIS — Z79.899: ICD-10-CM

## 2019-04-02 DIAGNOSIS — Z87.81: ICD-10-CM

## 2019-04-02 DIAGNOSIS — Z80.0: ICD-10-CM

## 2019-04-02 DIAGNOSIS — Z90.49: ICD-10-CM

## 2019-04-02 DIAGNOSIS — Z86.010: ICD-10-CM

## 2019-04-02 DIAGNOSIS — E66.01: ICD-10-CM

## 2019-04-02 DIAGNOSIS — K57.31: ICD-10-CM

## 2019-04-02 DIAGNOSIS — K64.8: Primary | ICD-10-CM

## 2019-04-02 DIAGNOSIS — I10: ICD-10-CM

## 2019-04-02 LAB
ALBUMIN SERPL-MCNC: 3.5 G/DL (ref 3.4–5)
ALT SERPL-CCNC: 24 U/L (ref 30–65)
ANION GAP SERPL CALC-SCNC: 10 MMOL/L (ref 7–16)
AST SERPL-CCNC: 16 U/L (ref 15–37)
BILIRUB SERPL-MCNC: 0.6 MG/DL
BUN SERPL-MCNC: 17 MG/DL (ref 7–18)
CALCIUM SERPL-MCNC: 9.3 MG/DL (ref 8.5–10.1)
CHLORIDE SERPL-SCNC: 105 MMOL/L (ref 98–107)
CO2 SERPL-SCNC: 24 MMOL/L (ref 21–32)
CREAT SERPL-MCNC: 0.8 MG/DL (ref 0.7–1.3)
ERYTHROCYTE [DISTWIDTH] IN BLOOD BY AUTOMATED COUNT: 13.9 % (ref 10.5–14.5)
GLUCOSE SERPL-MCNC: 126 MG/DL (ref 74–106)
HCT VFR BLD CALC: 46.8 % (ref 42–52)
HGB BLD-MCNC: 15.9 GM/DL (ref 14–18)
MCH RBC QN AUTO: 29.3 PG (ref 26–34)
MCHC RBC AUTO-ENTMCNC: 34 G/DL (ref 28–37)
MCV RBC: 86.1 FL (ref 80–100)
PLATELET # BLD: 254 THOU/UL (ref 150–400)
POTASSIUM SERPL-SCNC: 4.1 MMOL/L (ref 3.5–5.1)
PROT SERPL-MCNC: 7.7 G/DL (ref 6.4–8.2)
RBC # BLD AUTO: 5.44 MIL/UL (ref 4.5–6)
SODIUM SERPL-SCNC: 139 MMOL/L (ref 136–145)
WBC # BLD AUTO: 9 THOU/UL (ref 4–11)

## 2019-04-02 PROCEDURE — 62900: CPT

## 2019-04-02 PROCEDURE — 70005: CPT

## 2019-04-02 PROCEDURE — 10047: CPT

## 2019-04-02 PROCEDURE — 62110: CPT

## 2019-04-02 NOTE — NUR
PT ARRIVED FROM HOME AS DIRECT ADMIT. ADMISSION ASSESSMENT, HX AND EDUCATION
COMPLETE. DR NOTIFIED, ORDERS IMPLEMENTED, RX RECONCIED, IV STARTED. PT
RESTING COMFORTABLY.

## 2019-04-03 VITALS — DIASTOLIC BLOOD PRESSURE: 83 MMHG | SYSTOLIC BLOOD PRESSURE: 123 MMHG

## 2019-04-03 VITALS — DIASTOLIC BLOOD PRESSURE: 73 MMHG | SYSTOLIC BLOOD PRESSURE: 118 MMHG

## 2019-04-03 VITALS — SYSTOLIC BLOOD PRESSURE: 123 MMHG | DIASTOLIC BLOOD PRESSURE: 83 MMHG

## 2019-04-03 LAB
HCT VFR BLD CALC: 44.9 % (ref 42–52)
HGB BLD-MCNC: 15 GM/DL (ref 14–18)

## 2019-04-03 PROCEDURE — 0DJD8ZZ INSPECTION OF LOWER INTESTINAL TRACT, VIA NATURAL OR ARTIFICIAL OPENING ENDOSCOPIC: ICD-10-PCS | Performed by: INTERNAL MEDICINE

## 2019-04-03 NOTE — NUR
TOWARDS POC
PT A/O X4, VSS, AFEBRILE. PT STILL ON GI LAB FOR A PROCEDURE AT THIS MOMENT.
WILL CONTINUE TO MONITOR.

## 2019-04-03 NOTE — NUR
Cm assessment completed at bedside. Pt is a&ox4 and lives independently with
is wife. He is disabled and has SSD/medicare. He does not use any assistive
device and has worked part time off/on. He drives and has Medicare part D for
script coverage.  He is having a flex sig this am for lower GI bleed. He
denies any dc concerns at this time. No cm interventions indicated. Pt is up
ad bairon in his room. DC to outpt followup anticipated.

## 2019-04-03 NOTE — NUR
PROGRESS
 
PT A/O X4 UP AD YAJAIRA, IV SALINE LOCKED, DENIES PAIN OR NEED FOR PAIN MEDS.
VOIDING QS, STATES NO BM THIS SHIFT. PLAN TO HAVE A FLEX SIG. TODAY. CONSENT
ON CHART CONTINUE POC.

## 2019-08-16 ENCOUNTER — HOSPITAL ENCOUNTER (OUTPATIENT)
Dept: HOSPITAL 35 - CAT | Age: 54
End: 2019-08-16
Attending: INTERNAL MEDICINE
Payer: COMMERCIAL

## 2019-08-16 DIAGNOSIS — K76.0: ICD-10-CM

## 2019-08-16 DIAGNOSIS — K57.92: ICD-10-CM

## 2019-08-16 DIAGNOSIS — K57.30: Primary | ICD-10-CM

## 2020-03-04 ENCOUNTER — HOSPITAL ENCOUNTER (OUTPATIENT)
Dept: HOSPITAL 35 - CAT | Age: 55
End: 2020-03-04
Attending: INTERNAL MEDICINE
Payer: COMMERCIAL

## 2020-03-04 DIAGNOSIS — K76.0: ICD-10-CM

## 2020-03-04 DIAGNOSIS — R60.9: ICD-10-CM

## 2020-03-04 DIAGNOSIS — J84.10: Primary | ICD-10-CM

## 2020-03-04 DIAGNOSIS — J98.4: ICD-10-CM

## 2020-06-15 ENCOUNTER — HOSPITAL ENCOUNTER (EMERGENCY)
Dept: HOSPITAL 35 - ER | Age: 55
Discharge: HOME | End: 2020-06-15
Payer: COMMERCIAL

## 2020-06-15 VITALS — DIASTOLIC BLOOD PRESSURE: 78 MMHG | SYSTOLIC BLOOD PRESSURE: 114 MMHG

## 2020-06-15 VITALS — HEIGHT: 72 IN | BODY MASS INDEX: 42.66 KG/M2 | WEIGHT: 315 LBS
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
## 2020-06-15 DIAGNOSIS — R06.2: ICD-10-CM

## 2020-06-15 DIAGNOSIS — Z98.890: ICD-10-CM

## 2020-06-15 DIAGNOSIS — E66.01: ICD-10-CM

## 2020-06-15 DIAGNOSIS — R07.81: ICD-10-CM

## 2020-06-15 DIAGNOSIS — Z91.048: ICD-10-CM
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
## 2020-06-15 DIAGNOSIS — R10.9: ICD-10-CM

## 2020-06-15 DIAGNOSIS — Z90.49: ICD-10-CM

## 2020-06-15 DIAGNOSIS — G89.29: ICD-10-CM

## 2020-06-15 DIAGNOSIS — Z96.642: ICD-10-CM

## 2020-06-15 DIAGNOSIS — Z79.899: ICD-10-CM

## 2020-06-15 DIAGNOSIS — R07.89: Primary | ICD-10-CM

## 2020-06-15 DIAGNOSIS — Z96.641: ICD-10-CM
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
## 2020-06-22 ENCOUNTER — HOSPITAL ENCOUNTER (EMERGENCY)
Dept: HOSPITAL 35 - ER | Age: 55
Discharge: HOME | End: 2020-06-22
Payer: COMMERCIAL

## 2020-06-22 VITALS — HEIGHT: 72 IN | BODY MASS INDEX: 42.66 KG/M2 | WEIGHT: 315 LBS

## 2020-06-22 VITALS — SYSTOLIC BLOOD PRESSURE: 139 MMHG | DIASTOLIC BLOOD PRESSURE: 70 MMHG

## 2020-06-22 DIAGNOSIS — Z96.641: ICD-10-CM

## 2020-06-22 DIAGNOSIS — D72.829: ICD-10-CM

## 2020-06-22 DIAGNOSIS — Z88.8: ICD-10-CM

## 2020-06-22 DIAGNOSIS — E87.1: ICD-10-CM

## 2020-06-22 DIAGNOSIS — Z96.642: ICD-10-CM

## 2020-06-22 DIAGNOSIS — Z79.899: ICD-10-CM

## 2020-06-22 DIAGNOSIS — Z91.048: ICD-10-CM

## 2020-06-22 DIAGNOSIS — Z98.890: ICD-10-CM

## 2020-06-22 DIAGNOSIS — R73.9: Primary | ICD-10-CM

## 2020-06-22 DIAGNOSIS — Z90.49: ICD-10-CM

## 2020-06-22 LAB
ANION GAP SERPL CALC-SCNC: 9 MMOL/L (ref 7–16)
ANISOCYTOSIS BLD QL SMEAR: (no result)
BILIRUB UR-MCNC: NEGATIVE MG/DL
BUN SERPL-MCNC: 24 MG/DL (ref 7–18)
CALCIUM SERPL-MCNC: 9 MG/DL (ref 8.5–10.1)
CHLORIDE SERPL-SCNC: 93 MMOL/L (ref 98–107)
CO2 SERPL-SCNC: 25 MMOL/L (ref 21–32)
COLOR UR: YELLOW
CREAT SERPL-MCNC: 0.8 MG/DL (ref 0.7–1.3)
EOSINOPHIL NFR BLD: 2 % (ref 0–3)
ERYTHROCYTE [DISTWIDTH] IN BLOOD BY AUTOMATED COUNT: 13.7 % (ref 10.5–14.5)
GLUCOSE SERPL-MCNC: 396 MG/DL (ref 74–106)
GRANULOCYTES NFR BLD MANUAL: 70 % (ref 36–66)
HCT VFR BLD CALC: 48.6 % (ref 42–52)
HGB BLD-MCNC: 16.6 GM/DL (ref 14–18)
KETONES UR STRIP-MCNC: (no result) MG/DL
LYMPHOCYTES NFR BLD AUTO: 25 % (ref 24–44)
MCH RBC QN AUTO: 29.2 PG (ref 26–34)
MCHC RBC AUTO-ENTMCNC: 34.1 G/DL (ref 28–37)
MCV RBC: 85.4 FL (ref 80–100)
MONOCYTES NFR BLD: 3 % (ref 1–8)
NEUTROPHILS # BLD: 11.9 THOU/UL (ref 1.4–8.2)
PLATELET # BLD: 266 THOU/UL (ref 150–400)
POLYCHROMASIA BLD QL SMEAR: (no result)
POTASSIUM SERPL-SCNC: 4.6 MMOL/L (ref 3.5–5.1)
RBC # BLD AUTO: 5.69 MIL/UL (ref 4.5–6)
RBC # UR STRIP: NEGATIVE /UL
SODIUM SERPL-SCNC: 127 MMOL/L (ref 136–145)
SP GR UR STRIP: 1.02 (ref 1–1.03)
URINE CLARITY: CLEAR
URINE GLUCOSE-RANDOM*: (no result)
URINE LEUKOCYTES-REFLEX: NEGATIVE
URINE NITRITE-REFLEX: NEGATIVE
URINE PROTEIN (DIPSTICK): NEGATIVE
UROBILINOGEN UR STRIP-ACNC: 0.2 E.U./DL (ref 0.2–1)
WBC # BLD AUTO: 17 THOU/UL (ref 4–11)

## 2020-06-24 ENCOUNTER — HOSPITAL ENCOUNTER (INPATIENT)
Dept: HOSPITAL 35 - ER | Age: 55
LOS: 1 days | Discharge: HOME | DRG: 158 | End: 2020-06-25
Attending: INTERNAL MEDICINE | Admitting: INTERNAL MEDICINE
Payer: COMMERCIAL

## 2020-06-24 VITALS — SYSTOLIC BLOOD PRESSURE: 112 MMHG | DIASTOLIC BLOOD PRESSURE: 43 MMHG

## 2020-06-24 VITALS — SYSTOLIC BLOOD PRESSURE: 158 MMHG | DIASTOLIC BLOOD PRESSURE: 108 MMHG

## 2020-06-24 VITALS — SYSTOLIC BLOOD PRESSURE: 117 MMHG | DIASTOLIC BLOOD PRESSURE: 58 MMHG

## 2020-06-24 VITALS — WEIGHT: 315 LBS | HEIGHT: 72.01 IN | BODY MASS INDEX: 42.66 KG/M2

## 2020-06-24 VITALS — DIASTOLIC BLOOD PRESSURE: 72 MMHG | SYSTOLIC BLOOD PRESSURE: 127 MMHG

## 2020-06-24 DIAGNOSIS — E86.0: ICD-10-CM

## 2020-06-24 DIAGNOSIS — K05.219: Primary | ICD-10-CM

## 2020-06-24 DIAGNOSIS — M54.9: ICD-10-CM

## 2020-06-24 DIAGNOSIS — Z87.891: ICD-10-CM

## 2020-06-24 DIAGNOSIS — Z96.643: ICD-10-CM

## 2020-06-24 DIAGNOSIS — Z79.899: ICD-10-CM

## 2020-06-24 DIAGNOSIS — E11.9: ICD-10-CM

## 2020-06-24 DIAGNOSIS — G89.29: ICD-10-CM

## 2020-06-24 DIAGNOSIS — Z87.81: ICD-10-CM

## 2020-06-24 DIAGNOSIS — E66.9: ICD-10-CM

## 2020-06-24 DIAGNOSIS — Z88.8: ICD-10-CM

## 2020-06-24 DIAGNOSIS — Z90.49: ICD-10-CM

## 2020-06-24 LAB
ALBUMIN SERPL-MCNC: 3 G/DL (ref 3.4–5)
ALT SERPL-CCNC: 137 U/L (ref 30–65)
ANION GAP SERPL CALC-SCNC: 3 MMOL/L (ref 7–16)
AST SERPL-CCNC: 34 U/L (ref 15–37)
BASOPHILS NFR BLD AUTO: 0.5 % (ref 0–2)
BE(VIVO): -2.7 MMOL/L
BILIRUB SERPL-MCNC: 1.2 MG/DL (ref 0.2–1)
BILIRUB UR-MCNC: NEGATIVE MG/DL
BUN SERPL-MCNC: 15 MG/DL (ref 7–18)
CALCIUM SERPL-MCNC: 8.9 MG/DL (ref 8.5–10.1)
CHLORIDE SERPL-SCNC: 92 MMOL/L (ref 98–107)
CHOLEST SERPL-MCNC: 141 MG/DL (ref ?–200)
CO2 SERPL-SCNC: 29 MMOL/L (ref 21–32)
COLOR UR: YELLOW
CREAT SERPL-MCNC: 0.9 MG/DL (ref 0.7–1.3)
EOSINOPHIL NFR BLD: 1.4 % (ref 0–3)
ERYTHROCYTE [DISTWIDTH] IN BLOOD BY AUTOMATED COUNT: 14.1 % (ref 10.5–14.5)
GAS PNL BLDV: 68.7 MMHG (ref 35–45)
GLUCOSE SERPL-MCNC: 402 MG/DL (ref 74–106)
GRANULOCYTES NFR BLD MANUAL: 81.2 % (ref 36–66)
HCO3 BLD-SCNC: 20.5 MMOL/L (ref 22–26)
HCT VFR BLD CALC: 48.9 % (ref 42–52)
HDLC SERPL-MCNC: 35 MG/DL (ref 40–?)
HGB BLD-MCNC: 16.3 GM/DL (ref 14–18)
KETONES UR STRIP-MCNC: NEGATIVE MG/DL
LDLC SERPL-MCNC: 69 MG/DL (ref ?–100)
LIPASE: 137 U/L (ref 73–393)
LYMPHOCYTES NFR BLD AUTO: 11 % (ref 24–44)
MCH RBC QN AUTO: 29.2 PG (ref 26–34)
MCHC RBC AUTO-ENTMCNC: 33.4 G/DL (ref 28–37)
MCV RBC: 87.5 FL (ref 80–100)
MONOCYTES NFR BLD: 5.9 % (ref 1–8)
NEUTROPHILS # BLD: 9.5 THOU/UL (ref 1.4–8.2)
PCO2 BLDV: 32 MMHG (ref 41–51)
PLATELET # BLD: 201 THOU/UL (ref 150–400)
POTASSIUM SERPL-SCNC: 5 MMOL/L (ref 3.5–5.1)
PROT SERPL-MCNC: 7.1 G/DL (ref 6.4–8.2)
RBC # BLD AUTO: 5.59 MIL/UL (ref 4.5–6)
RBC # UR STRIP: (no result) /UL
SODIUM SERPL-SCNC: 124 MMOL/L (ref 136–145)
SP GR UR STRIP: 1.02 (ref 1–1.03)
TC:HDL: 4 RATIO
TRIGL SERPL-MCNC: 187 MG/DL (ref ?–150)
URINE CLARITY: CLEAR
URINE GLUCOSE-RANDOM*: (no result)
URINE LEUKOCYTES-REFLEX: NEGATIVE
URINE NITRITE-REFLEX: NEGATIVE
URINE PROTEIN (DIPSTICK): NEGATIVE
UROBILINOGEN UR STRIP-ACNC: 0.2 E.U./DL (ref 0.2–1)
VLDLC SERPL CALC-MCNC: 37 MG/DL (ref ?–40)
WBC # BLD AUTO: 11.7 THOU/UL (ref 4–11)

## 2020-06-24 PROCEDURE — 10195: CPT

## 2020-06-24 PROCEDURE — 10100: CPT

## 2020-06-25 VITALS — SYSTOLIC BLOOD PRESSURE: 131 MMHG | DIASTOLIC BLOOD PRESSURE: 65 MMHG

## 2020-06-25 VITALS — DIASTOLIC BLOOD PRESSURE: 71 MMHG | SYSTOLIC BLOOD PRESSURE: 131 MMHG

## 2020-06-25 VITALS — SYSTOLIC BLOOD PRESSURE: 130 MMHG | DIASTOLIC BLOOD PRESSURE: 82 MMHG

## 2020-06-25 NOTE — NUR
Pt arrived from ED at 2045 accompanied by staff via WC.A/OX4, VSS.Pt very
agitated/irritable with staff in regards to everything he has been told
regarding his blood sugars stating everyone is concluding he's diabetic
because he's overweight;writer reassured pt we're doing the best we can for
him and treat his condition. Upset about carb control diet as well,regular
soda given per request. Fall education reinforced;pt irritable when informed
he has to call staff before getting up since he had a fall recently and broke
his ribs,pt claims it was not a fall he tripped when walking the dog. Writer
assured pt his call light will be answered on a timely manner if he needs to
go to the bathroom,2 urinals provided for pt as well. C/o right rib cage pain
w/coughing medicated per EMAR with relief noted. IVF fluids infusing via LFA
w/o any problems. Fall precautions in place,bed alarm on. Resting quietly at
this time w/o distress will continue to monitor pt.

## 2020-06-25 NOTE — EKG
[facility]
[place]   [address]                     ELECTROCARDIOGRAM REPORT      
_______________________________________________________________________________
 
Name:       TANA WHITING                Room #:         443-P       ADM IN  
M.R.#:      [id number]                       Account #:      [id number]  
Admission:  20    Attend Phys:    Micheline Shannon
Discharge:              Date of Birth:  [birth year]  
                                                          Report #: [id number]
                                                                    [id number]
_______________________________________________________________________________
THIS REPORT FOR:  
 
cc:  Gulshan Stevens MD, Christopher B. MD Lundgren, Craig H. MD [facility]                                        ~
THIS REPORT FOR:   //name//                          
 
                         [facility] ED
                                       
Test Date:    2020               Test Time:    12:51:22
Pat Name:     TANA WHITING             Department:   
Patient ID:   [id number]            Room:         443
Gender:       M                        Technician:   ROCÍO
:          1965               Requested By: Remgiio Headley
Order Number: [id number] MD:   Zach Choi
                                 Measurements
Intervals                              Axis          
Rate:         96                       P:            16
NE:           141                      QRS:          257
QRSD:         97                       T:            57
QT:           344                                    
QTc:          435                                    
                           Interpretive Statements
Sinus rhythm
Left anterior fascicular block
Baseline wander in lead(s) III
Compared to ECG 2018 13:51:59
Atrial premature complex(es) no longer present
Electronically Signed On 2020 7:36:56 CDT by Zach Choi
[email or web address]
 
 
 
 
 
 
 
 
 
 
 
 
 
 
  <ELECTRONICALLY SIGNED>
   By: Zach Choi MD, FACC   
  20     0736
D: 20 1251                           _____________________________________
T: 20 1251                           Zach Choi MD, [facility]     /EPI

## 2020-06-25 NOTE — NUR
DC ORDERS RECEIVED. IV REMOVED FROM L FA. JANEE INSTRUCTIOS, F/U APPOINT.
REVIEWED WITH PT. SCRIPTS SENT TO [facility]. PT ESCORTED TO MAIN ENTRANCE
VIA W/C.

## 2020-06-26 LAB
EST. AVERAGE GLUCOSE BLD GHB EST-MCNC: 367 MG/DL
GLYCOHEMOGLOBIN (HGB A1C): 14.4 % (ref 4.8–5.6)

## 2021-01-15 ENCOUNTER — HOSPITAL ENCOUNTER (OUTPATIENT)
Dept: HOSPITAL 35 - LAB | Age: 56
End: 2021-01-15
Attending: SPECIALIST
Payer: COMMERCIAL

## 2021-01-15 DIAGNOSIS — Z01.812: Primary | ICD-10-CM

## 2021-01-15 DIAGNOSIS — Z20.822: ICD-10-CM

## 2021-01-20 ENCOUNTER — HOSPITAL ENCOUNTER (OUTPATIENT)
Dept: HOSPITAL 35 - GI | Age: 56
Discharge: HOME | End: 2021-01-20
Attending: SPECIALIST
Payer: COMMERCIAL

## 2021-01-20 VITALS — BODY MASS INDEX: 30.48 KG/M2 | WEIGHT: 225 LBS | HEIGHT: 72.01 IN

## 2021-01-20 DIAGNOSIS — K57.30: ICD-10-CM

## 2021-01-20 DIAGNOSIS — K62.5: Primary | ICD-10-CM

## 2021-01-20 DIAGNOSIS — Z86.010: ICD-10-CM

## 2021-01-20 DIAGNOSIS — Z87.891: ICD-10-CM

## 2021-01-20 DIAGNOSIS — F41.9: ICD-10-CM

## 2021-01-20 DIAGNOSIS — Z90.49: ICD-10-CM

## 2021-01-20 DIAGNOSIS — Z98.0: ICD-10-CM

## 2021-01-20 DIAGNOSIS — K64.8: ICD-10-CM

## 2021-01-20 DIAGNOSIS — Z98.890: ICD-10-CM

## 2021-01-20 DIAGNOSIS — K52.9: ICD-10-CM

## 2021-01-20 DIAGNOSIS — Z79.899: ICD-10-CM

## 2021-01-20 DIAGNOSIS — K64.4: ICD-10-CM

## 2021-01-20 DIAGNOSIS — K63.5: ICD-10-CM

## 2021-01-20 PROCEDURE — 62110: CPT

## 2021-01-20 PROCEDURE — 62900: CPT

## 2021-01-20 NOTE — P
[facility]
[address]
[place]   [address]                     PROCEDURE REPORT              
_______________________________________________________________________________
 
Name:       TANA WHITING                [facility]                     [id number]                       Account #:      [id number]  
Admission:  01/20/21    Attend Phys:    Dmitriy Marshall
Discharge:              Date of Birth:  [birth year]  
                                                          Report #: [id number]
                                                                    [id number]   
_______________________________________________________________________________
THIS REPORT FOR:  
 
cc:  Gulshan Stevens MD, Christopher B. MD McElhinney, Christian C. MD                                   ~
 
DATE OF SERVICE:  01/20/2021
 
 
PROCEDURE PERFORMED:  Colonoscopy with polypectomy.
 
HISTORY OF PRESENT ILLNESS:  The patient is a 55-year-old male with a history of
colon polyps, last colonoscopy was performed on 08/24/2018.  Multiple polyps
were removed at that time, some of which were tubular adenomas.  He has also had
a previous history of partial sigmoid resection for diverticulitis in the past. 
He underwent a flexible sigmoidoscopy for bright red blood per rectum on
04/03/2019, which showed hemorrhoids, but otherwise negative.  He is here for
repeat colonoscopy.  He does report a small amount of bright red blood per
rectum at times, average is 4-5 stools per day.  No immediate family history of
colon cancer.
 
DESCRIPTION OF PROCEDURE:  The risks and benefits of the procedure were
explained to the patient, those risks including but not limited to bleeding,
perforation and the risk of sedation.  He understood these risks and gave
informed consent.  Sedation was given using propofol per anesthesia.  Next, a
digital rectal exam was initially performed, which showed external hemorrhoids,
but otherwise normal.  Next, using a standard Olympus colonoscope, the scope was
placed in the patient's anus and advanced under direct vision to the cecum.  The
overall prep was good.  The cecum and ileocecal valve were normal in appearance.
 In the proximal ascending colon, a 6 mm sessile polyp was noted.  This was
removed by snare cautery, otherwise normal.  Transverse colon was normal. 
Scattered diverticula were noted in the descending and remaining sigmoid colon. 
The surgical anastomosis was noted in the rectosigmoid or in the remaining
sigmoid colon.  This was well healed, widely patent; however, there was a small
area of mild colitis.  Biopsies were obtained.  The rectal mucosa in the upper
and mid portion of the rectum was normal; however, on retroflexion in the distal
rectum, there was an area of colitis of a few centimeters.  Biopsies were
obtained to rule out the possibility of ulcerative colitis.  Also noted were
nonbleeding small internal hemorrhoids.  The scope was then withdrawn and the
procedure terminated.  The patient tolerated the procedure well.
 
IMPRESSION:
1.  Small colonic polyp.
2.  Left-sided diverticulosis.
3.  Surgical anastomosis noted in the left colon with surrounding colitis. 
Biopsies obtained.
 
 
 
[address]   [address]                     PROCEDURE REPORT              
_______________________________________________________________________________
 
Name:       TANA WHITING                Room #:                     REG CLI 
M.R.#:      [id number]                       Account #:      [id number]  
Admission:  01/20/21    Attend Phys:    Dmitriy Marshall
Discharge:              Date of Birth:  [birth year]  
                                                          Report #: [id number]
                                                                    [id number]   
_______________________________________________________________________________
 
4.  Distal rectal colitis.  Biopsies obtained to rule out ulcerative colitis.
5.  Internal and external hemorrhoids, nonbleeding.
 
RECOMMENDATIONS:
1.  Await biopsy results.
2.  Repeat colonoscopy in 5 years.
3.  If biopsies show evidence of inflammatory bowel disease, we will discuss
options including mesalamine with the patient at that time.
 
Thank you for allowing me to participate in his care.
 
 
 
 
 
 
 
 
 
 
 
 
 
 
 
 
 
 
 
 
 
 
 
 
 
 
 
 
 
 
 
 
 
  <ELECTRONICALLY SIGNED>
   By: Dmitriy Polanco MD   
  01/20/21     1131
D: 01/20/21 0942                           _____________________________________
T: 01/20/21 1006                           Dmitriy Polanco MD     /nt

## 2021-01-22 NOTE — PATH
[facility]
[address]
[place]   [address]                     PATHOLOGY RPT PROCEDURE       
_______________________________________________________________________________
 
Name:       INOCENTE WHITING                Room #:                     REG [facility] 
M.LAWRENCE.#:      [id number]     Account #:      [id number]  
Admission:  01/20/21    Date of Birth:  [birth year]  
Discharge:                                              Report #:    [id number]
                                                        Path Case #: [id number]
_______________________________________________________________________________
 
[facility] Accession Number: [id number]
.                                                                01
Material submitted:                                        .
PART A: colon - BX ASCENDING COLON POLYP. Modifiers: ascending
PART B: rectum - BX DISTAL RECTUM R/O ULCERATIVE CHOLITIS. Modifiers:
distal
PART C: colon - BX SURGICAL ANASTOMOSIS
.                                                                01
Clinical history:                                          .
HX OF POLYPS, DIVERTICULOSIS,HEMORRHOIDS
.                                                                01
**********************************************************************
Diagnosis:
A.  Colonic mucosa "ascending colon polyp biopsy":
- Polypoid hyperplastic inflamed colonic mucosa.
- There is no evidence of adenomatous change, high-grade dysplasia or
malignancy.
.
B.  Colonic mucosa "biopsy distal rectum R/O ulcerative colitis":
- Moderate chronic active colitis with acute cryptitis with architectural
distortion.
- See comment.
.
C.  Colonic mucosa "surgical anastomosis":
- Mild chronic colitis with architectural distortion with focal acute
cryptitis and lamina propria lymphocytes and plasma cells.
- These findings suggest inflammatory bowel disease.
- There is no evidence of adenomatous change, high-grade dysplasia or
malignancy.
- See comment.
(SHA:sanchez; 01/22/2021)
[facility]  01/22/2021  1425 Local
**********************************************************************
.                                                                01
Comment:
The differential includes inflammatory bowel disease.  No obvious
granulomas identified.
.
All of the fragments reveal changes.  This finding favors ulcerative
colitis.
.
However, infectious disease cannot be entirely ruled out.
.
Suggest clinical correlation.
.
There is no evidence of adenomatous change, high-grade dysplasia or
malignancy.
 
 
[address]   [address]                     PATHOLOGY RPT PROCEDURE       
_______________________________________________________________________________
 
Name:       INOCENTE WHITING                Room #:                     REG [facility].#:      [id number]     Account #:      [id number]  
Admission:  01/20/21    Date of Birth:  [birth year]  
Discharge:                                              Report #:    [id number]
                                                        Path Case #: [id number]
_______________________________________________________________________________
(SHA:sanchez; 01/22/2021)
.                                                                01
Electronically signed:                                     .
Polo Barlow MD, Pathologist
NPI- [id number]
.                                                                01
Gross description:                                         .
A.  The specimen is received in formalin, labeled "Inocente Whiting, biopsy
ascending colon polyp".  Received is a segment of pale tan soft tissue
measuring 0.5 cm in maximum dimensions.  The specimen is submitted
entirely in cassette A1.
.
B.  The specimen is received in formalin, labeled "Inocente Headleye, biopsy
distal rectum, R/O ulcerative colitis".  Received are five segments of
pale tan soft tissue ranging in size from 0.2 to 0.5 cm in maximum
dimensions.  The specimen is submitted entirely in cassette B1.
.
C.  The specimen is received in formalin, labeled "Inocente Sandy, biopsy
surgical anastomosis, R/O ulcerative colitis".  Received are three
segments of pale tan soft tissue ranging in size from 0.3 to 0.5 cm in
maximum dimensions.  The specimen is submitted entirely in cassette C1.
(CAA; 1/21/2021)
QAC/QAC  01/21/2021  1129 Local
.                                                                01
Pathologist provided ICD-10:
K52.9
.                                                                01
CPT                                                        .
245552, 567768, 414586
Specimen Comment: A courtesy copy of this report has been sent to [phone]
Specimen Comment: Report sent to 
***Performed at:  01
   [address], [place], [place]  [id number]
   MD Polo Barlow MD Phone:  [phone]

## 2021-04-27 ENCOUNTER — HOSPITAL ENCOUNTER (EMERGENCY)
Dept: HOSPITAL 35 - ER | Age: 56
Discharge: HOME | End: 2021-04-27
Payer: COMMERCIAL

## 2021-04-27 VITALS — HEIGHT: 72 IN | WEIGHT: 315 LBS | BODY MASS INDEX: 42.66 KG/M2

## 2021-04-27 VITALS — DIASTOLIC BLOOD PRESSURE: 60 MMHG | SYSTOLIC BLOOD PRESSURE: 123 MMHG
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
## 2021-04-27 DIAGNOSIS — Z91.048: ICD-10-CM

## 2021-04-27 DIAGNOSIS — S39.012A: Primary | ICD-10-CM

## 2021-04-27 DIAGNOSIS — Z88.8: ICD-10-CM

## 2021-04-27 DIAGNOSIS — S29.012A: ICD-10-CM

## 2021-04-27 DIAGNOSIS — Z90.49: ICD-10-CM
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
## 2021-04-27 DIAGNOSIS — Z79.899: ICD-10-CM
